# Patient Record
(demographics unavailable — no encounter records)

---

## 2024-11-19 NOTE — IMAGING
[Straightening consistent with spasm] : Straightening consistent with spasm [Foraminal narrowing] : Foraminal narrowing [Facet arthropathy] : Facet arthropathy [Disc space narrowing] : Disc space narrowing [AP] : anteroposterior [There are no fractures, subluxations or dislocations. No significant abnormalities are seen] : There are no fractures, subluxations or dislocations. No significant abnormalities are seen [de-identified] : LSPINE Inspection: No rash or ecchymosis Palpation: No tenderness to palpation or spasm in bilateral thoracic and lumbar paraspinal musculature, no SI joint tenderness to palpation ROM: Full with no pain Strength: 5/5 bilateral hip flexors, knee extensors, ankle dorsiflexors, EHL, ankle plantarflexors Sensation: Sensation present to light touch bilateral L2-S1 distributions Provocative maneuvers: Negative bilateral straight leg raise  CSPINE Inspection: No rash or ecchymosis Palpation: No spasm in traps, rhomboids, paracervicals ROM: Limited with no pain Strength: 5/5 bilateral deltoid, biceps, triceps, wrist flexors, wrist extensors, , abductors Sensation: Sensation present to light touch bilateral C5-T1 distributions Reflexes: Negative Alves's bilaterally [FreeTextEntry1] : Fusion L4-L5

## 2024-11-19 NOTE — ASSESSMENT
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 Lumbar radic.  MDP, Methocarbamol rx PT for lumbar and cervical spine  Pt can f/u with Dr. Sanabria for LESI  Follow up in 6 weeks

## 2024-11-19 NOTE — HISTORY OF PRESENT ILLNESS
[de-identified] : HERBER EVANS is a 75 year old female presenting with new onset neck and chronic lower back pain. Patient had a lumbar fusion 3/14/23.  Pt has seen PM, received epidural in the past, with short term relief. Sates she has intermittent anterior thigh pain with walking. Patient reports neck pain started a few days ago after sitting in a hard chair for an extended period of time; denies N/T or radiating pain.  [] : no

## 2024-12-03 NOTE — ASSESSMENT
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 Lumbar radic persists.  MRIs C and L spine and Pain f/u

## 2024-12-03 NOTE — HISTORY OF PRESENT ILLNESS
[Retired] : Work status: retired [de-identified] : 11/19/2024- HERBER EVANS is a 75 year old female presenting with new onset neck and chronic lower back pain. Patient had a lumbar fusion 3/14/23 (Dr. Terry Gonzalez, S) .  Pt has seen PM, received epidural in the past, with short term relief. Sates she has intermittent anterior thigh pain with walking. Patient reports neck pain started a few days ago after sitting in a hard chair for an extended period of time; denies N/T or radiating pain.  12/3/2024- Still severe neck pain despite MDP.  [] : Post Surgical Visit: no [FreeTextEntry5] : MDP helped a little

## 2024-12-03 NOTE — IMAGING
[de-identified] : LSPINE Inspection: No rash or ecchymosis Palpation: No tenderness to palpation or spasm in bilateral thoracic and lumbar paraspinal musculature, no SI joint tenderness to palpation ROM: Full with no pain Strength: 5/5 bilateral hip flexors, knee extensors, ankle dorsiflexors, EHL, ankle plantarflexors Sensation: Sensation present to light touch bilateral L2-S1 distributions Provocative maneuvers: Negative bilateral straight leg raise  CSPINE Inspection: No rash or ecchymosis Palpation: No spasm in traps, rhomboids, paracervicals ROM: Limited with no pain Strength: 5/5 bilateral deltoid, biceps, triceps, wrist flexors, wrist extensors, , abductors Sensation: Sensation present to light touch bilateral C5-T1 distributions Reflexes: Negative Alves's bilaterally [Straightening consistent with spasm] : Straightening consistent with spasm [Foraminal narrowing] : Foraminal narrowing [Facet arthropathy] : Facet arthropathy [Disc space narrowing] : Disc space narrowing [AP] : anteroposterior [There are no fractures, subluxations or dislocations. No significant abnormalities are seen] : There are no fractures, subluxations or dislocations. No significant abnormalities are seen [FreeTextEntry1] : Fusion L4-L5

## 2024-12-11 NOTE — ASSESSMENT
[FreeTextEntry1] : I personally reviewed the MRI/CT scan images and agree with the radiologist's report. The radiological findings were discussed with the patient.
[FreeTextEntry1] : I personally reviewed the MRI/CT scan images and agree with the radiologist's report. The radiological findings were discussed with the patient.
no

## 2024-12-11 NOTE — HISTORY OF PRESENT ILLNESS
[Lower back] : lower back [4] : 4 [2] : 2 [Dull/Aching] : dull/aching [Constant] : constant [Rest] : rest [Retired] : Work status: retired [] : no [de-identified] : activity

## 2024-12-11 NOTE — PHYSICAL EXAM
[de-identified] : PHYSICAL EXAM  Constitutional:  Appears well, no apparent distress Ability to communicate: Normal Respiratory: non-labored breathing Skin: no rash noted Head: normocephalic, atraumatic Neck: no visible thyroid enlargement Eyes: extraocular movements intact Neurologic: alert and oriented x3 Psychiatric: normal mood, affect, and behavior  Cervical: Palpation: bilateral trapezial spasm, bilateral trapezius tenderness and bilateral paracervical tenderness ROM: Diminished range of motion in all plains.  Patient notes pain at extremes of extension and rotation bilaterally.  Stiffness at extremes of extension and rotation bilaterally MMT: Motor exam is 5/5 through out bilateral upper extremities. Sensation: Light touch and pain is intact throughout bilateral upper extremities. Reflexes: No sustained clonus. Special Testing: Positive bilateral Spurling test   Assessemnt: Radiculopathy of cervical region (M54.12) Cervicalgia (M54.2)   Plan: After discussing various treatment options with the patient including but not limited to oral medications, physical therapy, exercise modalities as well as interventional spinal injections, we have decided with the following plan:  MRI Review  I personally reviewed the MRI/CT scan images and agree with the radiologist's report.  The radiological findings were discussed with the patient.     .  The risks, benefits and alternatives of the proposed procedure were explained in detail with the patient.  The risks outlined include but are not limited to infection, bleeding, post dural puncture headache, nerve injury, a temporary increase in pain, failure to resolve symptoms, allergic reaction, symptom recurrence, and possible elevation of blood sugar.  All questions were answered to patient's satisfaction and he/she verbalized an understanding.  Follow up 1-2 weeks post injection for re-evaluation.  Continue home exercises, stretching, activity modification, physical therapy, and conservative care.

## 2024-12-11 NOTE — PHYSICAL EXAM
[de-identified] : PHYSICAL EXAM  Constitutional:  Appears well, no apparent distress Ability to communicate: Normal Respiratory: non-labored breathing Skin: no rash noted Head: normocephalic, atraumatic Neck: no visible thyroid enlargement Eyes: extraocular movements intact Neurologic: alert and oriented x3 Psychiatric: normal mood, affect, and behavior  Cervical: Palpation: bilateral trapezial spasm, bilateral trapezius tenderness and bilateral paracervical tenderness ROM: Diminished range of motion in all plains.  Patient notes pain at extremes of extension and rotation bilaterally.  Stiffness at extremes of extension and rotation bilaterally MMT: Motor exam is 5/5 through out bilateral upper extremities. Sensation: Light touch and pain is intact throughout bilateral upper extremities. Reflexes: No sustained clonus. Special Testing: Positive bilateral Spurling test   Assessemnt: Radiculopathy of cervical region (M54.12) Cervicalgia (M54.2)   Plan: After discussing various treatment options with the patient including but not limited to oral medications, physical therapy, exercise modalities as well as interventional spinal injections, we have decided with the following plan:  MRI Review  I personally reviewed the MRI/CT scan images and agree with the radiologist's report.  The radiological findings were discussed with the patient.     .  The risks, benefits and alternatives of the proposed procedure were explained in detail with the patient.  The risks outlined include but are not limited to infection, bleeding, post dural puncture headache, nerve injury, a temporary increase in pain, failure to resolve symptoms, allergic reaction, symptom recurrence, and possible elevation of blood sugar.  All questions were answered to patient's satisfaction and he/she verbalized an understanding.  Follow up 1-2 weeks post injection for re-evaluation.  Continue home exercises, stretching, activity modification, physical therapy, and conservative care.

## 2024-12-11 NOTE — HISTORY OF PRESENT ILLNESS
[Lower back] : lower back [4] : 4 [2] : 2 [Dull/Aching] : dull/aching [Constant] : constant [Rest] : rest [Retired] : Work status: retired [] : no [de-identified] : activity

## 2024-12-11 NOTE — DISCUSSION/SUMMARY
[de-identified] : proceed C7T1 KELSEY  After discussing various treatment options with the patient including but not limited to oral medications, physical therapy, exercise, modalities as well as interventional spinal injections, we have decided with the following plan  I personally reviewed the MRI/CT scan images and agree with the radiologist's report. The radiological findings were discussed with the patient.   The risks, benefits, contents and alternatives to injection were explained in full to the patient. Risks outlined include but are not limited to infection,sepsis, bleeding, post-dural puncture headache, nerve damage, temporary increase in pain, syncopal episode, failure to resolve symptoms, allergic reaction, symptom recurrence, and elevation of blood sugar in diabetics. Cortisone may cause immunosuppression. Patient understands the risks. All questions were answered. After discussion of options, patient requested an injection. Information regarding the injection was given to the patient. Which medications to stop prior to the injection was explained to the patient as well.  Follow up in 1-2 weeks post injection for re-evaluation.   Conservative Care Continue Home exercises, stretching, activity modification, physical therapy, and conservative care.

## 2024-12-11 NOTE — DISCUSSION/SUMMARY
[de-identified] : proceed C7T1 KELSEY  After discussing various treatment options with the patient including but not limited to oral medications, physical therapy, exercise, modalities as well as interventional spinal injections, we have decided with the following plan  I personally reviewed the MRI/CT scan images and agree with the radiologist's report. The radiological findings were discussed with the patient.   The risks, benefits, contents and alternatives to injection were explained in full to the patient. Risks outlined include but are not limited to infection,sepsis, bleeding, post-dural puncture headache, nerve damage, temporary increase in pain, syncopal episode, failure to resolve symptoms, allergic reaction, symptom recurrence, and elevation of blood sugar in diabetics. Cortisone may cause immunosuppression. Patient understands the risks. All questions were answered. After discussion of options, patient requested an injection. Information regarding the injection was given to the patient. Which medications to stop prior to the injection was explained to the patient as well.  Follow up in 1-2 weeks post injection for re-evaluation.   Conservative Care Continue Home exercises, stretching, activity modification, physical therapy, and conservative care.

## 2025-01-06 NOTE — PROCEDURE
[FreeTextEntry3] : Date of Service: 01/06/2025   Account: [id number]  Patient: HERBER EVANS   YOB: 1949  Age: 75 year   Surgeon: Amaury Sanabria M.D.  Pre-Operative Diagnosis: Cervical Radiculopathy  Post Operative Diagnosis: Cervical Radiculopathy  Procedure: Interlaminar cervical epidural steroid injection (C7-T1) under fluoroscopic guidance   Anesthesia: MAC   This procedure was carried out using fluoroscopic guidance.  The risks and benefits of the procedure were discussed extensively with the patient.  The consent of the patient was obtained and the following procedure was performed.  The patient was placed in the prone position using a thoracic and chin support.  The cervical area was prepped and draped in a sterile fashion.  The fluoroscope visualized the C7-T1 interspace using slight cephalad-caudad angulation and this area was marked.  Using sterile technique the superficial skin was anesthetized with 1% Lidocaine without epinephrine.  A 18 gauge Tuohoy needle was advanced under fluoroscopy using ajkuk-yjnfhrrhr-leadg technique until ligament was engaged.  The stilette was then removed and a column of preservative free normal saline flushed through the tuohoy needle and left with a concave fluid level above the butterfly portion of the tuohoy needle.  The needle was then advanced under fluoroscopic guidance until the column of saline disappeared.  Lateral view confirmed final needle tip placement in the epidural space.  After negative aspiration for heme and CSF, 1 cc of omnipaque confirmed good cervical epiduragram.    Cervical epidurogram showed no evidence of intrathecal or intravascular flow, and good bilateral epidural flow from C3 to T2 levels.    An injectate of 3cc of preservative free normal saline plus 12 mg of betamethasone was then injected into the epidural space. The needle was subsequently removed and pressure was applied.  Anesthesia personnel were present throughout the procedure.  The patient tolerated the procedure well and was instructed to contact me immediately if there were any problems.   Amaury Sanabria M.D.

## 2025-01-23 NOTE — HISTORY OF PRESENT ILLNESS
[Lower back] : lower back [2] : 2 [Dull/Aching] : dull/aching [Constant] : constant [Rest] : rest [Retired] : Work status: retired [4] : 4 [FreeTextEntry1] : C7-T1 KELSEY-01/06/2025 [] : no [de-identified] : activity

## 2025-01-23 NOTE — PHYSICAL EXAM
[de-identified] : PHYSICAL EXAM  Constitutional:  Appears well, no apparent distress Ability to communicate: Normal Respiratory: non-labored breathing Skin: no rash noted Head: normocephalic, atraumatic Neck: no visible thyroid enlargement Eyes: extraocular movements intact Neurologic: alert and oriented x3 Psychiatric: normal mood, affect, and behavior   Neck: Palpation of the cervical spine is as follows: right trapezial spasm,  right trapezial tenderness,  right paracervical spasm,  right paracervical tenderness. Range of motion of the cervical spine is as follows: diminished range of motion in all planes Pain at extremes of rotation to right . Stiffness at extremes of rotation to right . Strength Testing for the cervical spine is as follows:  Right and left Deltoid strength 5/5 Right and left Biceps 5/5, Right Triceps 5/5,  Right and left Wrist Flexors 5/5, Right and left Finger Abductors 5/5, and Right and left Grasp 5/5 Neurological testing for the cervical spine is as follows: positive right  facet loading with extension. light touch is intact throughout both upper extremities, normal deep tendon reflexes bilateral upper extremities, negative Spurling test and negative Alves reflex.  Pain is aggravated by facet extension loading and is non-radicular in nature.

## 2025-01-23 NOTE — PHYSICAL EXAM
[de-identified] : PHYSICAL EXAM  Constitutional:  Appears well, no apparent distress Ability to communicate: Normal Respiratory: non-labored breathing Skin: no rash noted Head: normocephalic, atraumatic Neck: no visible thyroid enlargement Eyes: extraocular movements intact Neurologic: alert and oriented x3 Psychiatric: normal mood, affect, and behavior   Neck: Palpation of the cervical spine is as follows: right trapezial spasm,  right trapezial tenderness,  right paracervical spasm,  right paracervical tenderness. Range of motion of the cervical spine is as follows: diminished range of motion in all planes Pain at extremes of rotation to right . Stiffness at extremes of rotation to right . Strength Testing for the cervical spine is as follows:  Right and left Deltoid strength 5/5 Right and left Biceps 5/5, Right Triceps 5/5,  Right and left Wrist Flexors 5/5, Right and left Finger Abductors 5/5, and Right and left Grasp 5/5 Neurological testing for the cervical spine is as follows: positive right  facet loading with extension. light touch is intact throughout both upper extremities, normal deep tendon reflexes bilateral upper extremities, negative Spurling test and negative Alves reflex.  Pain is aggravated by facet extension loading and is non-radicular in nature.

## 2025-01-23 NOTE — HISTORY OF PRESENT ILLNESS
[Lower back] : lower back [2] : 2 [Dull/Aching] : dull/aching [Constant] : constant [Rest] : rest [Retired] : Work status: retired [4] : 4 [FreeTextEntry1] : C7-T1 KELSEY-01/06/2025 [] : no [de-identified] : activity

## 2025-01-23 NOTE — PROCEDURE
[Trigger point 1-2 muscle groups] : trigger point 1-2 muscle groups [Bilateral] : bilaterally of the [Cervical paraspinal muscle] : cervical paraspinal muscle [Pain] : pain [Inflammation] : inflammation [Alcohol] : alcohol [Ethyl Chloride sprayed topically] : ethyl chloride sprayed topically [Sterile technique used] : sterile technique used [___ cc    1%] : Lidocaine ~Vcc of 1%  [___ cc    0.25%] : Bupivacaine (Marcaine) ~Vcc of 0.25%  [___ cc    10mg] : Triamcinolone (Kenalog) ~Vcc of 10 mg  [] : Patient tolerated procedure well [Call if redness, pain or fever occur] : call if redness, pain or fever occur [Risks, benefits, alternatives discussed / Verbal consent obtained] : the risks benefits, and alternatives have been discussed, and verbal consent was obtained

## 2025-02-13 NOTE — PHYSICAL EXAM
[de-identified] : PHYSICAL EXAM  Constitutional:  Appears well, no apparent distress Ability to communicate: Normal Respiratory: non-labored breathing Skin: no rash noted Head: normocephalic, atraumatic Neck: no visible thyroid enlargement Eyes: extraocular movements intact Neurologic: alert and oriented x3 Psychiatric: normal mood, affect, and behavior   Neck: Palpation of the cervical spine is as follows: right trapezial spasm,  right trapezial tenderness,  right paracervical spasm,  right paracervical tenderness. Range of motion of the cervical spine is as follows: diminished range of motion in all planes Pain at extremes of rotation to right . Stiffness at extremes of rotation to right . Strength Testing for the cervical spine is as follows:  Right and left Deltoid strength 5/5 Right and left Biceps 5/5, Right Triceps 5/5,  Right and left Wrist Flexors 5/5, Right and left Finger Abductors 5/5, and Right and left Grasp 5/5 Neurological testing for the cervical spine is as follows: positive right  facet loading with extension. light touch is intact throughout both upper extremities, normal deep tendon reflexes bilateral upper extremities, negative Spurling test and negative Alves reflex.  Pain is aggravated by facet extension loading and is non-radicular in nature.    Assessment  Spondylosis of cervical region (M47.812) Cervicalgia (M54.2)   Plan: After discussing various treatment options with the patient including but not limited to oral medications, physical therapy, exercise modalities as well as interventional spinal injections, we have decided with the following plan:  MRI Review  I personally reviewed the MRI/CT scan images and agree with the radiologist's report.  The radiological findings were discussed with the patient.    The risks, benefits and alternatives of the proposed procedure were explained in detail with the patient.  The risks outlined include but are not limited to infection, bleeding, post dural puncture headache, nerve injury, a temporary increase in pain, failure to resolve symptoms, allergic reaction, symptom recurrence, and possible elevation of blood sugar.  All questions were answered to patient's satisfaction and he/she verbalized an understanding.  Follow up 1-2 weeks post injection for re-evaluation.  Continue home exercises, stretching, activity modification, physical therapy, and conservative care.

## 2025-02-13 NOTE — PHYSICAL EXAM
[de-identified] : PHYSICAL EXAM  Constitutional:  Appears well, no apparent distress Ability to communicate: Normal Respiratory: non-labored breathing Skin: no rash noted Head: normocephalic, atraumatic Neck: no visible thyroid enlargement Eyes: extraocular movements intact Neurologic: alert and oriented x3 Psychiatric: normal mood, affect, and behavior   Neck: Palpation of the cervical spine is as follows: right trapezial spasm,  right trapezial tenderness,  right paracervical spasm,  right paracervical tenderness. Range of motion of the cervical spine is as follows: diminished range of motion in all planes Pain at extremes of rotation to right . Stiffness at extremes of rotation to right . Strength Testing for the cervical spine is as follows:  Right and left Deltoid strength 5/5 Right and left Biceps 5/5, Right Triceps 5/5,  Right and left Wrist Flexors 5/5, Right and left Finger Abductors 5/5, and Right and left Grasp 5/5 Neurological testing for the cervical spine is as follows: positive right  facet loading with extension. light touch is intact throughout both upper extremities, normal deep tendon reflexes bilateral upper extremities, negative Spurling test and negative Alves reflex.  Pain is aggravated by facet extension loading and is non-radicular in nature.    Assessment  Spondylosis of cervical region (M47.812) Cervicalgia (M54.2)   Plan: After discussing various treatment options with the patient including but not limited to oral medications, physical therapy, exercise modalities as well as interventional spinal injections, we have decided with the following plan:  MRI Review  I personally reviewed the MRI/CT scan images and agree with the radiologist's report.  The radiological findings were discussed with the patient.    The risks, benefits and alternatives of the proposed procedure were explained in detail with the patient.  The risks outlined include but are not limited to infection, bleeding, post dural puncture headache, nerve injury, a temporary increase in pain, failure to resolve symptoms, allergic reaction, symptom recurrence, and possible elevation of blood sugar.  All questions were answered to patient's satisfaction and he/she verbalized an understanding.  Follow up 1-2 weeks post injection for re-evaluation.  Continue home exercises, stretching, activity modification, physical therapy, and conservative care.

## 2025-02-13 NOTE — HISTORY OF PRESENT ILLNESS
[Lower back] : lower back [4] : 4 [Dull/Aching] : dull/aching [Constant] : constant [Rest] : rest [Injection therapy] : injection therapy [Retired] : Work status: retired [FreeTextEntry1] : RT C3-C6 MBB-01/28/2025 C7-T1 KELSEY-01/06/2025 [] : no [de-identified] : activity

## 2025-02-13 NOTE — HISTORY OF PRESENT ILLNESS
[Lower back] : lower back [4] : 4 [Dull/Aching] : dull/aching [Constant] : constant [Rest] : rest [Injection therapy] : injection therapy [Retired] : Work status: retired [FreeTextEntry1] : RT C3-C6 MBB-01/28/2025 C7-T1 KELSEY-01/06/2025 [] : no [de-identified] : activity

## 2025-03-20 NOTE — ASSESSMENT
[FreeTextEntry1] : Ground fall 3 weeks ago with drop arm on exam suggestive of a massive rotator cuff tear.  She is also getting limitation of rotation suggesting evolving early adhesive capsulitis.  Advised MRI rule out rotator cuff tear.  Meloxicam 15 mg prescription sent to the pharmacy.  Prognosis uncertain.  Explained at her age this may be an irreparable situation that may need a course of physical therapy and eventually consider surgical intervention.  The patient's current medication management of their orthopedic diagnosis was reviewed today: There is a moderate risk of morbidity with further treatment, especially from use of prescription strength medications and possible side effects of these medications which include upset stomach with oral medications, skin reactions to topical medications and cardiac/renal/diabetes issues with long term use.

## 2025-03-20 NOTE — HISTORY OF PRESENT ILLNESS
[Sudden] : sudden [7] : 7 [3] : 3 [Dull/Aching] : dull/aching [Sharp] : sharp [Constant] : constant [Nothing helps with pain getting better] : Nothing helps with pain getting better [Retired] : Work status: retired [] : no [FreeTextEntry1] : Left shoulder  [de-identified] : activities, lifting  [de-identified] : 2/25/25 [de-identified] : [facility] [de-identified] : xrays

## 2025-03-20 NOTE — IMAGING
[de-identified] : Shoulder Exam Inspection: No swelling, no ecchymosis, no tiago deformity Palpation: Tenderness to palpation over greater tuberosity Stability: No instability or tenderness over AC joint Range of Motion: Active Forward Flexion 180 Passive FF: 160; ER: 70: IR: 30; ER at the side 50 Strength: 2/5 supraspinatus, 3/5infraspinatus and 4-/5 subscapularis; there is pain with strength testing Special testing: Positive Trujillo test, positive impingement sign; Speeds and yergason negative Neuro: Motor and sensory intact distally [Left] : left shoulder [Degenerative change] : Degenerative change [Type 2 acromion] : Type 2 acromion

## 2025-03-27 NOTE — HISTORY OF PRESENT ILLNESS
[7] : 7 [6] : 6 [Dull/Aching] : dull/aching [Sharp] : sharp [de-identified] : 3/27/25: Patient is here to go over the MRI results of the left shoulder. Pain has remained the same.  [] : Post Surgical Visit: no [FreeTextEntry1] : left shoulder  [de-identified] : none

## 2025-03-27 NOTE — ASSESSMENT
[FreeTextEntry1] : I personally reviewed interpreted the MRI images in detail.  As I suspected she does have a massive rotator cuff tear with retraction beyond the level of the glenoid.  Given her age I would suspect that this is likely not repairable at this juncture.  We discussed a comprehensive treatment plan including attempted acute surgical repair but highly unlikely to be successful.  Also discussed eventual need for RSA which is likely a much more reliable operation for pain given her age.  Alternatively also could consider a course of physical therapy for anterior deltoid rehabilitation.  She is adamant about any surgical intervention at this time so reasonable to proceed with a corticosteroid injection for pain as well as course of physical therapy.  This is her nondominant side.  We discussed the implications of nonoperative treatment long-term.  The meloxicam prescription has not been helpful so she will discontinue it.  I have recommended and prescribed the tramadol 50 mg for the night symptoms as needed.  Side effects reviewed.  Follow-up 6 weeks to reassess.  The patient's current medication management of their orthopedic diagnosis was reviewed today: There is a moderate risk of morbidity with further treatment, especially from use of prescription strength medications and possible side effects of these medications which include upset stomach with oral medications, skin reactions to topical medications and cardiac/renal/diabetes issues with long term use.

## 2025-03-27 NOTE — IMAGING
[de-identified] : Shoulder Exam Passive FF: 160; ER: 70: IR: 30; ER at the side 50 Strength: 2/5 supraspinatus, 3/5infraspinatus and 4-/5 subscapularis; there is pain with strength testing Neuro: Motor and sensory intact distally